# Patient Record
Sex: FEMALE | Race: BLACK OR AFRICAN AMERICAN | Employment: OTHER | URBAN - METROPOLITAN AREA
[De-identification: names, ages, dates, MRNs, and addresses within clinical notes are randomized per-mention and may not be internally consistent; named-entity substitution may affect disease eponyms.]

---

## 2017-12-20 ENCOUNTER — HOSPITAL ENCOUNTER (OUTPATIENT)
Dept: MAMMOGRAPHY | Age: 72
Discharge: HOME | End: 2017-12-20
Attending: OBSTETRICS & GYNECOLOGY
Payer: MEDICARE

## 2017-12-20 DIAGNOSIS — Z12.31 VISIT FOR SCREENING MAMMOGRAM: ICD-10-CM

## 2017-12-20 PROCEDURE — 77067 SCR MAMMO BI INCL CAD: CPT

## 2019-12-19 ENCOUNTER — HOSPITAL ENCOUNTER (OUTPATIENT)
Dept: MAMMOGRAPHY | Age: 74
Discharge: HOME | End: 2019-12-19
Attending: OBSTETRICS & GYNECOLOGY
Payer: MEDICARE

## 2019-12-19 DIAGNOSIS — Z12.31 VISIT FOR SCREENING MAMMOGRAM: ICD-10-CM

## 2019-12-19 PROCEDURE — 77067 SCR MAMMO BI INCL CAD: CPT

## 2020-11-24 ENCOUNTER — TRANSCRIBE ORDER (OUTPATIENT)
Dept: SCHEDULING | Age: 75
End: 2020-11-24

## 2020-11-24 DIAGNOSIS — Z12.31 VISIT FOR SCREENING MAMMOGRAM: Primary | ICD-10-CM

## 2021-02-23 ENCOUNTER — TRANSCRIBE ORDER (OUTPATIENT)
Dept: SCHEDULING | Age: 76
End: 2021-02-23

## 2021-02-23 DIAGNOSIS — S32.010A CLOSED COMPRESSION FRACTURE OF L1 LUMBAR VERTEBRA, INITIAL ENCOUNTER (HCC): ICD-10-CM

## 2021-02-23 DIAGNOSIS — M54.50 LOW BACK PAIN: Primary | ICD-10-CM

## 2021-11-01 ENCOUNTER — TRANSCRIBE ORDER (OUTPATIENT)
Dept: SCHEDULING | Age: 76
End: 2021-11-01

## 2021-11-01 DIAGNOSIS — Z12.31 SCREENING MAMMOGRAM FOR HIGH-RISK PATIENT: Primary | ICD-10-CM

## 2021-11-15 ENCOUNTER — TRANSCRIBE ORDER (OUTPATIENT)
Dept: GENERAL RADIOLOGY | Age: 76
End: 2021-11-15

## 2021-11-15 ENCOUNTER — TRANSCRIBE ORDER (OUTPATIENT)
Dept: SCHEDULING | Age: 76
End: 2021-11-15

## 2021-11-15 ENCOUNTER — HOSPITAL ENCOUNTER (OUTPATIENT)
Dept: GENERAL RADIOLOGY | Age: 76
Discharge: HOME | End: 2021-11-15
Attending: PHYSICIAN ASSISTANT
Payer: MEDICARE

## 2021-11-15 DIAGNOSIS — M54.16 LUMBAR RADICULOPATHY: Primary | ICD-10-CM

## 2021-11-15 DIAGNOSIS — M54.16 LUMBAR RADICULOPATHY: ICD-10-CM

## 2021-11-15 DIAGNOSIS — M54.16 RADICULOPATHY, LUMBAR REGION: Primary | ICD-10-CM

## 2021-11-15 PROCEDURE — 72114 X-RAY EXAM L-S SPINE BENDING: CPT

## 2021-11-24 ENCOUNTER — HOSPITAL ENCOUNTER (OUTPATIENT)
Dept: MRI IMAGING | Age: 76
Discharge: HOME | End: 2021-11-24
Attending: PHYSICIAN ASSISTANT
Payer: MEDICARE

## 2021-11-24 DIAGNOSIS — M54.16 RADICULOPATHY, LUMBAR REGION: ICD-10-CM

## 2021-11-24 PROCEDURE — 72148 MRI LUMBAR SPINE W/O DYE: CPT

## 2022-05-27 ENCOUNTER — OFFICE VISIT (OUTPATIENT)
Dept: ORTHOPEDIC SURGERY | Age: 77
End: 2022-05-27
Payer: MEDICARE

## 2022-05-27 VITALS — HEIGHT: 68 IN | BODY MASS INDEX: 32.89 KG/M2 | WEIGHT: 217 LBS

## 2022-05-27 DIAGNOSIS — M25.562 LEFT KNEE PAIN, UNSPECIFIED CHRONICITY: ICD-10-CM

## 2022-05-27 DIAGNOSIS — M17.12 ARTHRITIS OF LEFT KNEE: Primary | ICD-10-CM

## 2022-05-27 PROCEDURE — 20610 DRAIN/INJ JOINT/BURSA W/O US: CPT | Performed by: PHYSICIAN ASSISTANT

## 2022-05-27 RX ORDER — ESCITALOPRAM OXALATE 10 MG/1
10 TABLET ORAL DAILY
COMMUNITY

## 2022-05-27 RX ORDER — AMLODIPINE BESYLATE 10 MG/1
TABLET ORAL DAILY
COMMUNITY

## 2022-05-27 RX ORDER — ALPRAZOLAM 0.5 MG/1
TABLET ORAL
COMMUNITY

## 2022-05-27 NOTE — PROGRESS NOTES
Chante Britton (: 1945) is a 67 y.o. female patient, here for evaluation of the following chief complaint(s):  Knee Pain       ASSESSMENT/PLAN:  Below is the assessment and plan developed based on review of pertinent history, physical exam, labs, studies, and medications. 28-year-old female comes in today for left knee pain. She has known severe tricompartmental knee osteoarthritic changes. She is been seen in the past for this. She has had aspirations and injections in the past.  Most recent injection was less than 2 months ago, not eligible for repeat steroid at this time. She would like to try a gel injection. After verbal consent was obtained I aspirated 45 cc clear yellow synovial fluid I then injected gel one into the left knee joint using sterile technique. Patient tolerated well. Encouraged ongoing activity modification physical therapy exercises at home. Plans to monitor symptoms and follow-up in the next couple of months to further discuss a left total knee replacement. She is status post a right total knee replacement years ago by Dr. Etta Muhammad continues to do well from this. At next visit she would need updated imaging, patient aware of this. 1. Arthritis of left knee  -     DRAIN/INJECT LARGE JOINT/BURSA  -     hyaluronate sodium, cross-linked (GEL-ONE) injection syrg 30 mg; 30 mg, Intra artICUlar, ONCE, 1 dose, On 22 at 1600  2. Left knee pain, unspecified chronicity      Encounter Diagnoses   Name Primary?  Left knee pain, unspecified chronicity     Arthritis of left knee Yes        No follow-ups on file. SUBJECTIVE/OBJECTIVE:  Chante Britton (: 1945) is a 67 y.o. female who presents today for the following:  Chief Complaint   Patient presents with    Knee Pain       28-year-old female comes in today for evaluation of left knee pain. She has known osteoarthritic changes noted throughout.   She has been receiving conservative treatment including activity modifications, steroid injections aspiration as needed. Most recent aspiration and injection did not last more than a couple weeks. She is moving in the next couple of weeks and wants to hold off on surgery at this time. She is here today to discuss other possible conservative treatment measures at this time. IMAGING:  XR Results (most recent):  Results from Hospital Encounter encounter on 11/15/21    XR SPINE LUMB COMP W BEND    Narrative  EXAM: XR SPINE LUMB COMP W BEND    INDICATION: Low back pain    TECHNIQUE: AP, neutral lateral, flexion lateral, extension lateral, and  bilateral oblique views of the lumbar spine. COMPARISON: None. FINDINGS: There is left curvature of the upper lumbar spine and right curvature  of the lower lumbar spine. Grade 1 anterolisthesis of L5-S1 measures 6 mm in the  neutral position, 5 mm and extended position, and 8 mm in the flexed position. No spondylolysis. Moderate-severe facet arthrosis L4-S1. No acute fracture or compression deformity. Moderate-severe degenerative disc  disease throughout the lumbar spine. There are vascular calcifications. Soft  tissue calcifications in the pelvis may be vascular or within uterine fibroids. Impression  1. No fracture or instability. 2. Grade 1 anterolisthesis of L5-S1 due to facet arthrosis. 3. Moderate-severe degenerative disease. Allergies   Allergen Reactions    Codeine Unknown (comments)    Demerol [Meperidine] Unknown (comments)    Morphine Unknown (comments)       Current Outpatient Medications   Medication Sig    ALPRAZolam (Xanax) 0.5 mg tablet Take  by mouth.  amLODIPine (Norvasc) 10 mg tablet Take  by mouth daily.  escitalopram oxalate (Lexapro) 10 mg tablet Take 10 mg by mouth daily. Current Facility-Administered Medications   Medication    hyaluronate sodium, cross-linked (GEL-ONE) injection syrg 30 mg       No past medical history on file.      Past Surgical History:   Procedure Laterality Date    HX BREAST BIOPSY Left 1999    benign surgical     HX BREAST BIOPSY Left 2000    benign surgical       Family History   Problem Relation Age of Onset    Breast Cancer Maternal Aunt         45s    Breast Cancer Paternal Aunt         76    Breast Cancer Paternal Aunt         76s        Social History     Tobacco Use    Smoking status: Not on file    Smokeless tobacco: Not on file   Substance Use Topics    Alcohol use: Not on file        All systems reviewed x 12 and were negative with the exception of None      No flowsheet data found. Vitals:  Ht 5' 8\" (1.727 m)   Wt 217 lb (98.4 kg)   BMI 32.99 kg/m²    Body mass index is 32.99 kg/m². Physical Exam    General: NAD, well developed, well nourished, alert and oriented x 3. Cardiac: Extremities well perfused    Respiratory: Nonlabored breathing    LLE: Mild antalgic gait. Moderate effusion noted. No previous incisions noted. ROM 0-120 degrees. Grossly stable to varus/valgus stress and anterior/posterior drawer tests. Medial and lateral joint line tenderness. Crepitus with range of motion. Motor grossly intact. RLE: Normal gait and station. Negative stinchfield. No effusion noted. previous incisions noted. ROM 0-120 degrees. Grossly stable to varus/valgus stress and anterior/posterior drawer tests. Negative McMurrays. Motor grossly intact. Vascular: Palpable pedal pulses, equal bilaterally. Skin: Warm well perfused, cap refill < 2 sec. Hannah Teran M.D. was available for immediate consultation as the supervising physician. An electronic signature was used to authenticate this note.   -- Charli Almeida PA-C

## 2022-08-01 ENCOUNTER — OFFICE VISIT (OUTPATIENT)
Dept: ORTHOPEDIC SURGERY | Age: 77
End: 2022-08-01
Payer: MEDICARE

## 2022-08-01 DIAGNOSIS — M25.562 LEFT KNEE PAIN, UNSPECIFIED CHRONICITY: Primary | ICD-10-CM

## 2022-08-01 DIAGNOSIS — M17.12 ARTHRITIS OF LEFT KNEE: ICD-10-CM

## 2022-08-01 PROCEDURE — G8536 NO DOC ELDER MAL SCRN: HCPCS | Performed by: ORTHOPAEDIC SURGERY

## 2022-08-01 PROCEDURE — G8400 PT W/DXA NO RESULTS DOC: HCPCS | Performed by: ORTHOPAEDIC SURGERY

## 2022-08-01 PROCEDURE — 1090F PRES/ABSN URINE INCON ASSESS: CPT | Performed by: ORTHOPAEDIC SURGERY

## 2022-08-01 PROCEDURE — 99215 OFFICE O/P EST HI 40 MIN: CPT | Performed by: ORTHOPAEDIC SURGERY

## 2022-08-01 PROCEDURE — 1101F PT FALLS ASSESS-DOCD LE1/YR: CPT | Performed by: ORTHOPAEDIC SURGERY

## 2022-08-01 PROCEDURE — G8427 DOCREV CUR MEDS BY ELIG CLIN: HCPCS | Performed by: ORTHOPAEDIC SURGERY

## 2022-08-01 PROCEDURE — G8432 DEP SCR NOT DOC, RNG: HCPCS | Performed by: ORTHOPAEDIC SURGERY

## 2022-08-01 PROCEDURE — 1123F ACP DISCUSS/DSCN MKR DOCD: CPT | Performed by: ORTHOPAEDIC SURGERY

## 2022-08-01 PROCEDURE — G8417 CALC BMI ABV UP PARAM F/U: HCPCS | Performed by: ORTHOPAEDIC SURGERY

## 2022-08-01 RX ORDER — METHYLPREDNISOLONE 4 MG/1
TABLET ORAL
Qty: 1 DOSE PACK | Refills: 0 | Status: SHIPPED | OUTPATIENT
Start: 2022-08-01

## 2022-08-01 NOTE — PROGRESS NOTES
Justen Ferraro (: 1945) is a 67 y.o. female patient, here for evaluation of the following chief complaint(s):  Follow-up (Left knee pain)       ASSESSMENT/PLAN:  Below is the assessment and plan developed based on review of pertinent history, physical exam, labs, studies, and medications. 71-year-old female comes in today for evaluation of left knee pain. We have seen her 1 time in the distant past for the same problem. She has tricompartmental knee osteoarthritis. She has been dealing with this conservatively. She has had multiple injections as well as aspirations. The pain has been worsening over time. Now bothers her every day. She has severe valgus deformity with end-stage lateral joint space narrowing. She wanted to discuss surgical options. We discussed possibility of total knee arthroplasty. She understands her elevated risk secondary to her age and BMI of 35. She had a previous right knee did well. Risks and benefits of surgery discussed. She wants to move forward with this later in the year. In the meantime I also called in a steroid Dosepak. Risks and benefits discussed    Risks and benefits of joint arthroplasty discussed at length including but not limited to bleeding, need for blood transfusion, infection, damage to surrounding structures, intra-operative fracture, blood clots, pulmonary embolism, death. The patient understands the risks of surgery. All questions answered. They elected to move forward. 1. Left knee pain, unspecified chronicity  -     XR KNEE LT MIN 4 V; Future  2. Arthritis of left knee      Encounter Diagnoses   Name Primary? Left knee pain, unspecified chronicity Yes    Arthritis of left knee         No follow-ups on file.       SUBJECTIVE/OBJECTIVE:  Justen Ferraro (: 1945) is a 67 y.o. female who presents today for the following:  Chief Complaint   Patient presents with    Follow-up     Left knee pain       71-year-old female comes in today for evaluation of left knee pain. We have seen her 1 time in the distant past for the same problem. She has tricompartmental knee osteoarthritis. She has been dealing with this conservatively. She has had multiple injections as well as aspirations. The pain has been worsening over time. Now bothers her every day. IMAGING:  XR Results (most recent):  Results from Appointment encounter on 08/01/22    XR KNEE LT MIN 4 V    Narrative  4 views left knee ordered and intimately reviewed. Severe end-stage tricompartmental knee osteoarthritis with severe valgus deformity. Right knee well fixed with all polytibia       Allergies   Allergen Reactions    Codeine Unknown (comments)    Demerol [Meperidine] Unknown (comments)    Morphine Unknown (comments)       Current Outpatient Medications   Medication Sig    methylPREDNISolone (MEDROL DOSEPACK) 4 mg tablet Per dose pack instructions    ALPRAZolam (Xanax) 0.5 mg tablet Take  by mouth. amLODIPine (Norvasc) 10 mg tablet Take  by mouth daily. escitalopram oxalate (Lexapro) 10 mg tablet Take 10 mg by mouth daily. No current facility-administered medications for this visit. History reviewed. No pertinent past medical history. Past Surgical History:   Procedure Laterality Date    HX BREAST BIOPSY Left 1999    benign surgical     HX BREAST BIOPSY Left 2000    benign surgical       Family History   Problem Relation Age of Onset    Breast Cancer Maternal Aunt         45s    Breast Cancer Paternal Aunt         76    Breast Cancer Paternal Aunt         76s        Social History     Tobacco Use    Smoking status: Never    Smokeless tobacco: Never   Substance Use Topics    Alcohol use: Not on file        All systems reviewed x 12 and were negative with the exception of None      No flowsheet data found. Vitals: There were no vitals taken for this visit. There is no height or weight on file to calculate BMI.     Physical Exam    General: NAD, well developed, well nourished, alert and oriented x 3. Cardiac: Extremities well perfused    Respiratory: Nonlabored breathing    LLE: Antalgic gait. Mild to moderate effusion noted. No previous incisions noted. ROM 0-120 degrees. Grossly stable to varus/valgus stress and anterior/posterior drawer tests. Valgus deformity. Tender throughout. Positive crepitus with range of motion motor grossly intact. RLE: Normal gait and station. Negative stinchfield. No effusion noted. Midline incision well-healed. ROM 0-120 degrees. Grossly stable to varus/valgus stress and anterior/posterior drawer tests. Negative McMurrays. Motor grossly intact. Vascular: Palpable pedal pulses, equal bilaterally. Skin: Warm well perfused, cap refill < 2 sec. An electronic signature was used to authenticate this note.   -- Gisell Mirza MD

## 2022-08-10 ENCOUNTER — TRANSCRIBE ORDER (OUTPATIENT)
Dept: SCHEDULING | Age: 77
End: 2022-08-10

## 2022-08-10 DIAGNOSIS — Z12.31 VISIT FOR SCREENING MAMMOGRAM: Primary | ICD-10-CM

## 2022-08-18 ENCOUNTER — HOSPITAL ENCOUNTER (OUTPATIENT)
Dept: MAMMOGRAPHY | Age: 77
Discharge: HOME | End: 2022-08-18
Attending: OBSTETRICS & GYNECOLOGY
Payer: MEDICARE

## 2022-08-18 DIAGNOSIS — Z12.31 VISIT FOR SCREENING MAMMOGRAM: ICD-10-CM

## 2022-08-18 PROCEDURE — 77067 SCR MAMMO BI INCL CAD: CPT

## 2022-12-19 DIAGNOSIS — M17.12 ARTHRITIS OF LEFT KNEE: Primary | ICD-10-CM

## 2023-09-22 ENCOUNTER — TRANSCRIBE ORDERS (OUTPATIENT)
Facility: HOSPITAL | Age: 78
End: 2023-09-22

## 2023-09-22 DIAGNOSIS — Z12.31 VISIT FOR SCREENING MAMMOGRAM: Primary | ICD-10-CM

## 2023-11-20 ENCOUNTER — HOSPITAL ENCOUNTER (OUTPATIENT)
Facility: HOSPITAL | Age: 78
Discharge: HOME | End: 2023-11-23
Attending: OBSTETRICS & GYNECOLOGY
Payer: MEDICARE

## 2023-11-20 VITALS — WEIGHT: 206 LBS | HEIGHT: 68 IN | BODY MASS INDEX: 31.22 KG/M2

## 2023-11-20 DIAGNOSIS — Z12.31 VISIT FOR SCREENING MAMMOGRAM: ICD-10-CM

## 2023-11-20 PROCEDURE — 77063 BREAST TOMOSYNTHESIS BI: CPT

## 2025-06-23 ENCOUNTER — TRANSCRIBE ORDERS (OUTPATIENT)
Facility: HOSPITAL | Age: 80
End: 2025-06-23

## 2025-06-23 DIAGNOSIS — Z12.31 VISIT FOR SCREENING MAMMOGRAM: Primary | ICD-10-CM

## 2025-07-15 ENCOUNTER — HOSPITAL ENCOUNTER (OUTPATIENT)
Facility: HOSPITAL | Age: 80
Discharge: HOME | End: 2025-07-18
Payer: MEDICARE

## 2025-07-15 VITALS — BODY MASS INDEX: 30.31 KG/M2 | HEIGHT: 68 IN | WEIGHT: 200 LBS

## 2025-07-15 DIAGNOSIS — Z12.31 VISIT FOR SCREENING MAMMOGRAM: ICD-10-CM

## 2025-07-15 PROCEDURE — 77063 BREAST TOMOSYNTHESIS BI: CPT
